# Patient Record
Sex: MALE | Race: WHITE | NOT HISPANIC OR LATINO | Employment: FULL TIME
[De-identification: names, ages, dates, MRNs, and addresses within clinical notes are randomized per-mention and may not be internally consistent; named-entity substitution may affect disease eponyms.]

---

## 2021-12-16 ENCOUNTER — OFFICE VISIT (OUTPATIENT)
Dept: INTERNAL MEDICINE | Facility: OTHER | Age: 46
End: 2021-12-16
Payer: COMMERCIAL

## 2021-12-16 VITALS
SYSTOLIC BLOOD PRESSURE: 134 MMHG | OXYGEN SATURATION: 95 % | TEMPERATURE: 98.2 F | BODY MASS INDEX: 20.44 KG/M2 | WEIGHT: 138 LBS | HEART RATE: 97 BPM | HEIGHT: 69 IN | DIASTOLIC BLOOD PRESSURE: 83 MMHG

## 2021-12-16 DIAGNOSIS — Z13.220 SCREENING FOR LIPID DISORDERS: ICD-10-CM

## 2021-12-16 DIAGNOSIS — F90.9 ATTENTION DEFICIT HYPERACTIVITY DISORDER (ADHD), UNSPECIFIED ADHD TYPE: ICD-10-CM

## 2021-12-16 DIAGNOSIS — R22.1 LUMP ON NECK: ICD-10-CM

## 2021-12-16 DIAGNOSIS — Z13.29 SCREENING FOR THYROID DISORDER: ICD-10-CM

## 2021-12-16 DIAGNOSIS — Z13.1 SCREENING FOR DIABETES MELLITUS: ICD-10-CM

## 2021-12-16 PROCEDURE — 99204 OFFICE O/P NEW MOD 45 MIN: CPT | Mod: GC

## 2021-12-16 RX ORDER — LISDEXAMFETAMINE DIMESYLATE 30 MG/1
CAPSULE ORAL
COMMUNITY
Start: 2021-11-18

## 2021-12-16 ASSESSMENT — PATIENT HEALTH QUESTIONNAIRE - PHQ9: CLINICAL INTERPRETATION OF PHQ2 SCORE: 0

## 2021-12-16 NOTE — PROGRESS NOTES
Date of Service:  12/16/21    CC: Establish care, tooth ache    HPI:  Ar Sotelo is a 46 y.o. male with a PMH of ADHD on Vyvanse presents here to establish [facility]. 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  Patient has been complaining of right-sided bottom tooth ache that he has seen a dentist twice with x-rays and was told that they could not find anything wrong with it.  He also reports of lump that is marble sized to the right jaw that is occasionally sensitive when he experiences the tooth ache, and occasionally enlarges at times.  Patient was previously given Norco but ran out but has been able to deal with the pain with ibuprofen the few times if flared up.  Denies any sore throat, difficulty swallowing, exudates, fever, chills, sick contact, rashes, headaches, neck rigidity, neck pain. Patient admits to not drinking much fluids as he should be.    Review of systems:  Review of Systems   Constitutional: Negative for chills, fever, malaise/fatigue and weight loss.   HENT: Negative for sinus pain and sore throat.         +tooth ache   Eyes: Negative.    Respiratory: Negative for cough, sputum production and shortness of breath.    Cardiovascular: Negative for chest pain and palpitations.   Gastrointestinal: Negative for abdominal pain, constipation, diarrhea, nausea and vomiting.   Genitourinary: Negative.    Musculoskeletal: Negative for myalgias and neck pain.   Neurological: Negative for dizziness, focal weakness and headaches.      Past Medical History:  There are no problems to display for this patient.  vyvanse - ADHD     Past Surgical History:    has no past surgical history on file.      Medications:  Current Outpatient Medications   Medication Sig Dispense Refill   • VYVANSE 30 MG capsule        No current facility-administered medications for this visit.       Allergies:  No Known Allergies    Family History:   family history is not on file.    Father- hypertension     Social History:    Social History     Tobacco Use   • Smoking status: Not on file   • Smokeless tobacco: Not on file   Substance Use Topics   • Alcohol use: Not on file   • Drug use: Not on file     Tobacco: former smoker (in 20s years, 1ppd, quit at age 30)  Alcohol: 8 drinks/week (beers, drinks 4-5 times a week)  Illicit drugs: no marijuana  Employment:     Physical Exam:  Vitals:    12/16/21 0819   BP: 134/83   Pulse: 97   Temp: 36.8 °C (98.2 °F)   SpO2: 95%     Body mass index is 20.68 kg/m².  Physical Exam  Vitals reviewed.   Constitutional:       General: He is not in acute distress.     Appearance: Normal appearance. He is normal weight. He is not toxic-appearing.   HENT:      Head: Normocephalic and atraumatic.      Right Ear: External ear normal.      Left Ear: External ear normal.      Nose: Nose normal.      Mouth/Throat:      Pharynx: No oropharyngeal exudate or posterior oropharyngeal erythema.   Eyes:      General: No scleral icterus.     Extraocular Movements: Extraocular movements intact.      Conjunctiva/sclera: Conjunctivae normal.   Neck:      Comments: No palpable mass noted to submandibular or neck  Cardiovascular:      Rate and Rhythm: Normal rate and regular rhythm.      Heart sounds: Normal heart sounds. No murmur heard.  No friction rub. No gallop.    Pulmonary:      Effort: No respiratory distress.      Breath sounds: Normal breath sounds. No wheezing, rhonchi or rales.   Abdominal:      General: There is no distension.      Palpations: Abdomen is soft.      Tenderness: There is no abdominal tenderness. There is no guarding or rebound.   Musculoskeletal:         General: No swelling.      Cervical back: Normal range of motion.      Right lower leg: No edema.      Left lower leg: No edema.   Lymphadenopathy:      Cervical: No cervical adenopathy.   Skin:     General: Skin is warm and dry.   Neurological:      General: No focal deficit present.      Mental  Status: He is alert and oriented to person, place, and time.   Psychiatric:         Mood and Affect: Mood normal.         Behavior: Behavior normal.       Assessment/Plan:  1. Lump on neck  - US-SOFT TISSUES OF HEAD - NECK; Future  - no significant palpable lump in submandibular or cervical region.  As lump fluctuates in size and occasionally sensitive per patient and no abnormal dental findings by dentistry, will obtain ultrasound of neck.   -No erythema or exudates seen to the oropharynx less likely peritonsillar abscess or strep. Oral exam did not reveal any abnormal dentition. It is possible that this lump could be from sialadenitis, encouraged adequate p.o. fluid intake and can try sour cand to stimulate salivary gland.      2. Screening for lipid disorders  - Lipid Profile; Future  - Comp Metabolic Panel; Future    3. Screening for thyroid disorder  - TSH WITH REFLEX TO FT4; Future    4. Screening for diabetes mellitus  - HEMOGLOBIN A1C; Future    5. Attention deficit hyperactivity disorder (ADHD), unspecified ADHD type  -Patient on Vyvanse, states that his ADHD is well controlled with this medication.  Prescribed through his psych provider    Other orders  - VYVANSE 30 MG capsule    All imaging results and lab results and consult notes are reviewed at this visit.  Followup: Return in about 5 weeks (around 1/20/2022), or if symptoms worsen or fail to improve.    Joan Landon, DO  Internal Medicine PGY-1

## 2021-12-17 PROBLEM — F90.9 ADHD: Status: ACTIVE | Noted: 2021-12-17

## 2021-12-17 ASSESSMENT — ENCOUNTER SYMPTOMS
EYES NEGATIVE: 1
SINUS PAIN: 0
SHORTNESS OF BREATH: 0
DIARRHEA: 0
COUGH: 0
VOMITING: 0
HEADACHES: 0
PALPITATIONS: 0
DIZZINESS: 0
NECK PAIN: 0
NAUSEA: 0
SORE THROAT: 0
MYALGIAS: 0
SPUTUM PRODUCTION: 0
ABDOMINAL PAIN: 0
WEIGHT LOSS: 0
FEVER: 0
CHILLS: 0
CONSTIPATION: 0
FOCAL WEAKNESS: 0

## 2022-01-21 ENCOUNTER — HOSPITAL ENCOUNTER (OUTPATIENT)
Dept: RADIOLOGY | Facility: MEDICAL CENTER | Age: 47
End: 2022-01-21
Payer: COMMERCIAL

## 2022-02-25 ENCOUNTER — APPOINTMENT (OUTPATIENT)
Dept: INTERNAL MEDICINE | Facility: OTHER | Age: 47
End: 2022-02-25
Payer: COMMERCIAL

## 2023-02-26 ENCOUNTER — HOSPITAL ENCOUNTER (EMERGENCY)
Facility: MEDICAL CENTER | Age: 48
End: 2023-02-26
Attending: EMERGENCY MEDICINE
Payer: COMMERCIAL

## 2023-02-26 VITALS
BODY MASS INDEX: 20.73 KG/M2 | HEART RATE: 85 BPM | SYSTOLIC BLOOD PRESSURE: 115 MMHG | HEIGHT: 69 IN | RESPIRATION RATE: 18 BRPM | TEMPERATURE: 97.7 F | DIASTOLIC BLOOD PRESSURE: 79 MMHG | WEIGHT: 140 LBS | OXYGEN SATURATION: 97 %

## 2023-02-26 DIAGNOSIS — S40.011A CONTUSION OF RIGHT SHOULDER, INITIAL ENCOUNTER: ICD-10-CM
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
## 2023-02-26 PROCEDURE — 305948 HCHG GREEN TRAUMA ACT PRE-NOTIFY NO CC

## 2023-02-26 PROCEDURE — 99284 EMERGENCY DEPT VISIT MOD MDM: CPT | Mod: EDC

## 2023-02-26 ASSESSMENT — LIFESTYLE VARIABLES
HAVE YOU EVER FELT YOU SHOULD CUT DOWN ON YOUR DRINKING: NO
TOTAL SCORE: 0
DO YOU DRINK ALCOHOL: NO
TOTAL SCORE: 0
TOTAL SCORE: 0
HAVE PEOPLE ANNOYED YOU BY CRITICIZING YOUR DRINKING: NO
EVER FELT BAD OR GUILTY ABOUT YOUR DRINKING: NO
CONSUMPTION TOTAL: INCOMPLETE
EVER HAD A DRINK FIRST THING IN THE MORNING TO STEADY YOUR NERVES TO GET RID OF A HANGOVER: NO
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
## 2023-10-12 ENCOUNTER — OFFICE VISIT (OUTPATIENT)
Dept: URGENT CARE | Facility: CLINIC | Age: 48
End: 2023-10-12
Payer: COMMERCIAL

## 2023-10-12 VITALS
RESPIRATION RATE: 16 BRPM | DIASTOLIC BLOOD PRESSURE: 64 MMHG | BODY MASS INDEX: 20.59 KG/M2 | SYSTOLIC BLOOD PRESSURE: 110 MMHG | WEIGHT: 139 LBS | OXYGEN SATURATION: 98 % | HEIGHT: 69 IN | TEMPERATURE: 97 F | HEART RATE: 78 BPM

## 2023-10-12 DIAGNOSIS — R10.9 ABDOMINAL CRAMPING: ICD-10-CM

## 2023-10-12 DIAGNOSIS — K52.9 GASTROENTERITIS: ICD-10-CM

## 2023-10-12 LAB
FLUAV RNA SPEC QL NAA+PROBE: NEGATIVE
FLUBV RNA SPEC QL NAA+PROBE: NEGATIVE
RSV RNA SPEC QL NAA+PROBE: NEGATIVE
S PYO DNA SPEC NAA+PROBE: NOT DETECTED
SARS-COV-2 RNA RESP QL NAA+PROBE: NEGATIVE

## 2023-10-12 PROCEDURE — 3078F DIAST BP <80 MM HG: CPT | Performed by: NURSE PRACTITIONER

## 2023-10-12 PROCEDURE — 3074F SYST BP LT 130 MM HG: CPT | Performed by: NURSE PRACTITIONER

## 2023-10-12 PROCEDURE — 0241U POCT CEPHEID COV-2, FLU A/B, RSV - PCR: CPT | Performed by: NURSE PRACTITIONER

## 2023-10-12 PROCEDURE — 99204 OFFICE O/P NEW MOD 45 MIN: CPT | Performed by: NURSE PRACTITIONER

## 2023-10-12 PROCEDURE — 87651 STREP A DNA AMP PROBE: CPT | Performed by: NURSE PRACTITIONER

## 2023-10-12 RX ORDER — DICYCLOMINE HCL 20 MG
20 TABLET ORAL EVERY 8 HOURS PRN
Qty: 20 TABLET | Refills: 0 | Status: SHIPPED | OUTPATIENT
Start: 2023-10-12 | End: 2023-12-01

## 2023-10-12 NOTE — PROGRESS NOTES
Chief Complaint   Patient presents with    Abdominal Pain     X 4 days, fever, sore throat, body aches, chills       HISTORY OF PRESENT ILLNESS: Patient is a pleasant 48 y.o. male who presents to urgent care today with GI upset.  Patient notes that his symptoms for started 4 days ago with malaise, fatigue, body aches, tactile fever.  Initially he noticed a sore throat which has since resolved.  After onset of symptoms he also developed generalized abdominal pain, pain has been somewhat constant with intermittent worsening.  States that his pain was significant last night making it difficult for him to sleep.  Does report several episodes of diarrhea per day.  Denies any blood or black in his stools.  Denies any nausea or vomiting.  He has tried ibuprofen without much improvement.  States that several his of his children are ill as well but with nondescript symptoms (1 has been diagnosed with hand-foot-and-mouth).  He denies previous history of the same.    Patient Active Problem List    Diagnosis Date Noted    ADHD 12/17/2021       Allergies:Patient has no known allergies.    Current Outpatient Medications Ordered in Epic   Medication Sig Dispense Refill    dicyclomine (BENTYL) 20 MG Tab Take 1 Tablet by mouth every 8 hours as needed (Cramping). 20 Tablet 0    VYVANSE 30 MG capsule  (Patient not taking: Reported on 10/12/2023)       No current [facility]-ordered facility-administered medications on file.       History reviewed. No pertinent past medical history.    Social History     Tobacco Use    Smoking status: Never    Smokeless tobacco: Never   Vaping Use    Vaping Use: Never used   Substance Use Topics    Alcohol use: Not Currently    Drug use: Never       No family status information on file.   History reviewed. No pertinent family history.    ROS:  Review of Systems   Constitutional: Positive for fever, chills, malaise, and fatigue.   HENT: Positive for initial sore throat.  Negative for ear pain, nosebleeds,  "congestion, neck pain.    Eyes: Negative for vision changes.   Neuro: Negative for headache, sensory changes, weakness, seizure, LOC.   Cardiovascular: Negative for chest pain, palpitations, orthopnea and leg swelling.   Respiratory: Negative for cough, sputum production, shortness of breath and wheezing.   Gastrointestinal: Positive for abdominal pain, diarrhea.  Negative for nausea, vomiting.  Genitourinary: Negative for dysuria, urgency and frequency.  Musculoskeletal: Negative for falls, neck pain, back pain, joint pain.  Skin: Negative for rash, diaphoresis.     Exam:  /64 (BP Location: Left arm, Patient Position: Sitting, BP Cuff Size: Adult)   Pulse 78   Temp 36.1 °C (97 °F)   Resp 16   Ht 1.753 m (5' 9\")   Wt 63 kg (139 lb)   SpO2 98%   General: well-nourished, well-developed male in NAD  Head: normocephalic, atraumatic  Eyes: PERRLA, no conjunctival injection, acuity grossly intact, lids normal.  Ears: normal shape and symmetry, no tenderness, no discharge. External canals are without any significant edema or erythema. Tympanic membranes are without any inflammation, no effusion. Gross auditory acuity is intact.  Nose: symmetrical without tenderness, no discharge.  Mouth/Throat: reasonable hygiene, no erythema, exudates or tonsillar enlargement.  Neck: no masses, range of motion within normal limits, no tracheal deviation. No obvious thyroid enlargement.   Lymph: no cervical adenopathy. No supraclavicular adenopathy.   Neuro: alert and oriented. Cranial nerves 1-12 grossly intact. No sensory deficit.   Cardiovascular: regular rate and rhythm. No edema.  Pulmonary: no distress. Chest is symmetrical with respiration, no wheezes, crackles, or rhonchi.   Abdomen: soft, non-tender, no guarding, no hepatosplenomegaly.  No CVA tenderness.  Musculoskeletal: no clubbing, appropriate muscle tone, gait is stable.  Skin: warm, dry, intact, no clubbing, no cyanosis, no rashes.   Psych: appropriate mood, " affect, judgement.         Assessment/Plan:  1. Gastroenteritis  POCT GROUP A STREP, PCR    POCT CoV-2, Flu A/B, RSV by PCR      2. Abdominal cramping  dicyclomine (BENTYL) 20 MG Tab    POCT GROUP A STREP, PCR    POCT CoV-2, Flu A/B, RSV by PCR            The patient is a pleasant 48-year-old male who presents with GI symptoms.  He has no correlating abdominal tenderness, suspicion for acute abdomen low.  Suspect viral process.  He did initially have a sore throat as well though, therefore a strep test is initiated.  In addition, COVID/flu/RSV swab also initiated in clinic.  Will notify patient of referral results.  In the meantime, increase fluid intake, bland diet encouraged.  Bentyl as directed.  If no improvement in the next 24 hours, patient instructed to go to emergency department for further evaluation and care, he is in agreement.    Supportive care, differential diagnoses, and indications for immediate follow-up discussed with patient.   Pathogenesis of diagnosis discussed including typical length and natural progression.   Instructed to return to clinic or nearest emergency department for any change in condition, further concerns, or worsening of symptoms.  Patient states understanding of the plan of care and discharge instructions.  Instructed to make an appointment, for follow up, with his primary care provider.        Please note that this dictation was created using voice recognition software. I have made every reasonable attempt to correct obvious errors, but I expect that there are errors of grammar and possibly content that I did not discover before finalizing the note.      LILIBETH Kelly.

## 2023-10-22 ENCOUNTER — APPOINTMENT (OUTPATIENT)
Dept: RADIOLOGY | Facility: MEDICAL CENTER | Age: 48
End: 2023-10-22
Attending: EMERGENCY MEDICINE
Payer: COMMERCIAL

## 2023-10-22 ENCOUNTER — HOSPITAL ENCOUNTER (EMERGENCY)
Facility: MEDICAL CENTER | Age: 48
End: 2023-10-22
Attending: EMERGENCY MEDICINE
Payer: COMMERCIAL

## 2023-10-22 VITALS
WEIGHT: 137.13 LBS | TEMPERATURE: 97.8 F | RESPIRATION RATE: 14 BRPM | BODY MASS INDEX: 20.31 KG/M2 | DIASTOLIC BLOOD PRESSURE: 82 MMHG | SYSTOLIC BLOOD PRESSURE: 121 MMHG | HEART RATE: 86 BPM | HEIGHT: 69 IN | OXYGEN SATURATION: 97 %

## 2023-10-22 DIAGNOSIS — R93.5 ABNORMAL ABDOMINAL ULTRASOUND: ICD-10-CM

## 2023-10-22 DIAGNOSIS — R10.13 EPIGASTRIC PAIN: ICD-10-CM

## 2023-10-22 LAB
ALBUMIN SERPL BCP-MCNC: 4.5 G/DL (ref 3.2–4.9)
ALBUMIN/GLOB SERPL: 1.5 G/DL
ALP SERPL-CCNC: 55 U/L (ref 30–99)
ALT SERPL-CCNC: 17 U/L (ref 2–50)
ANION GAP SERPL CALC-SCNC: 12 MMOL/L (ref 7–16)
APPEARANCE UR: CLEAR
AST SERPL-CCNC: 16 U/L (ref 12–45)
BASOPHILS # BLD AUTO: 1 % (ref 0–1.8)
BASOPHILS # BLD: 0.07 K/UL (ref 0–0.12)
BILIRUB SERPL-MCNC: 1 MG/DL (ref 0.1–1.5)
BILIRUB UR QL STRIP.AUTO: NEGATIVE
BUN SERPL-MCNC: 13 MG/DL (ref 8–22)
CALCIUM ALBUM COR SERPL-MCNC: 9 MG/DL (ref 8.5–10.5)
CALCIUM SERPL-MCNC: 9.4 MG/DL (ref 8.5–10.5)
CHLORIDE SERPL-SCNC: 101 MMOL/L (ref 96–112)
CO2 SERPL-SCNC: 26 MMOL/L (ref 20–33)
COLOR UR: YELLOW
CREAT SERPL-MCNC: 0.97 MG/DL (ref 0.5–1.4)
EOSINOPHIL # BLD AUTO: 0.25 K/UL (ref 0–0.51)
EOSINOPHIL NFR BLD: 3.5 % (ref 0–6.9)
ERYTHROCYTE [DISTWIDTH] IN BLOOD BY AUTOMATED COUNT: 37.8 FL (ref 35.9–50)
GFR SERPLBLD CREATININE-BSD FMLA CKD-EPI: 97 ML/MIN/1.73 M 2
GLOBULIN SER CALC-MCNC: 3 G/DL (ref 1.9–3.5)
GLUCOSE SERPL-MCNC: 98 MG/DL (ref 65–99)
GLUCOSE UR STRIP.AUTO-MCNC: NEGATIVE MG/DL
HCT VFR BLD AUTO: 46.9 % (ref 42–52)
HGB BLD-MCNC: 16.2 G/DL (ref 14–18)
IMM GRANULOCYTES # BLD AUTO: 0.01 K/UL (ref 0–0.11)
IMM GRANULOCYTES NFR BLD AUTO: 0.1 % (ref 0–0.9)
KETONES UR STRIP.AUTO-MCNC: ABNORMAL MG/DL
LACTATE SERPL-SCNC: 1.4 MMOL/L (ref 0.5–2)
LEUKOCYTE ESTERASE UR QL STRIP.AUTO: NEGATIVE
LIPASE SERPL-CCNC: 30 U/L (ref 11–82)
LYMPHOCYTES # BLD AUTO: 2.01 K/UL (ref 1–4.8)
LYMPHOCYTES NFR BLD: 28 % (ref 22–41)
MCH RBC QN AUTO: 30.7 PG (ref 27–33)
MCHC RBC AUTO-ENTMCNC: 34.5 G/DL (ref 32.3–36.5)
MCV RBC AUTO: 88.8 FL (ref 81.4–97.8)
MICRO URNS: ABNORMAL
MONOCYTES # BLD AUTO: 0.56 K/UL (ref 0–0.85)
MONOCYTES NFR BLD AUTO: 7.8 % (ref 0–13.4)
NEUTROPHILS # BLD AUTO: 4.27 K/UL (ref 1.82–7.42)
NEUTROPHILS NFR BLD: 59.6 % (ref 44–72)
NITRITE UR QL STRIP.AUTO: NEGATIVE
NRBC # BLD AUTO: 0 K/UL
NRBC BLD-RTO: 0 /100 WBC (ref 0–0.2)
PH UR STRIP.AUTO: 6.5 [PH] (ref 5–8)
PLATELET # BLD AUTO: 351 K/UL (ref 164–446)
PMV BLD AUTO: 10.1 FL (ref 9–12.9)
POTASSIUM SERPL-SCNC: 3.9 MMOL/L (ref 3.6–5.5)
PROT SERPL-MCNC: 7.5 G/DL (ref 6–8.2)
PROT UR QL STRIP: NEGATIVE MG/DL
RBC # BLD AUTO: 5.28 M/UL (ref 4.7–6.1)
RBC UR QL AUTO: NEGATIVE
SODIUM SERPL-SCNC: 139 MMOL/L (ref 135–145)
SP GR UR STRIP.AUTO: 1.02
UROBILINOGEN UR STRIP.AUTO-MCNC: 1 MG/DL
WBC # BLD AUTO: 7.2 K/UL (ref 4.8–10.8)

## 2023-10-22 PROCEDURE — 36415 COLL VENOUS BLD VENIPUNCTURE: CPT

## 2023-10-22 PROCEDURE — 700102 HCHG RX REV CODE 250 W/ 637 OVERRIDE(OP): Performed by: EMERGENCY MEDICINE

## 2023-10-22 PROCEDURE — A9270 NON-COVERED ITEM OR SERVICE: HCPCS | Performed by: EMERGENCY MEDICINE

## 2023-10-22 PROCEDURE — 85025 COMPLETE CBC W/AUTO DIFF WBC: CPT

## 2023-10-22 PROCEDURE — 700105 HCHG RX REV CODE 258: Performed by: EMERGENCY MEDICINE

## 2023-10-22 PROCEDURE — 99284 EMERGENCY DEPT VISIT MOD MDM: CPT

## 2023-10-22 PROCEDURE — 83605 ASSAY OF LACTIC ACID: CPT

## 2023-10-22 PROCEDURE — 81003 URINALYSIS AUTO W/O SCOPE: CPT

## 2023-10-22 PROCEDURE — 80053 COMPREHEN METABOLIC PANEL: CPT

## 2023-10-22 PROCEDURE — 76705 ECHO EXAM OF ABDOMEN: CPT

## 2023-10-22 PROCEDURE — 83690 ASSAY OF LIPASE: CPT

## 2023-10-22 RX ORDER — SODIUM CHLORIDE 9 MG/ML
1000 INJECTION, SOLUTION INTRAVENOUS ONCE
Status: COMPLETED | OUTPATIENT
Start: 2023-10-22 | End: 2023-10-22

## 2023-10-22 RX ORDER — OMEPRAZOLE 20 MG/1
20 CAPSULE, DELAYED RELEASE ORAL DAILY
Qty: 14 CAPSULE | Refills: 0 | Status: SHIPPED | OUTPATIENT
Start: 2023-10-22 | End: 2023-11-05

## 2023-10-22 RX ADMIN — SODIUM CHLORIDE 1000 ML: 9 INJECTION, SOLUTION INTRAVENOUS at 13:00

## 2023-10-22 RX ADMIN — LIDOCAINE HYDROCHLORIDE 15 ML: 20 SOLUTION OROPHARYNGEAL at 13:01

## 2023-10-22 NOTE — ED TRIAGE NOTES
Ar Sotelo  48 y.o. male  Chief Complaint   Patient presents with    Abdominal Pain     Pt states he has been having unresolved abdominal pain for 2x weeks. Pt states it began with some flu like symptoms and diarrhea, with severe abdominal pain. Now pt has persistent abdominal pain, made worse by eating food.        Vitals:    10/22/23 1148   BP: 126/86   Pulse: (!) 102   Resp: 14   Temp: 36.5 °C (97.7 °F)   SpO2: 96%       Patient educated on triage process and encouraged to alert staff of any changes in condition.    Pt ambulatory to triage with the above complaint

## 2023-10-22 NOTE — ED PROVIDER NOTES
"ED Provider Note    CHIEF COMPLAINT  Chief Complaint   Patient presents with    Abdominal Pain     Pt states he has been having unresolved abdominal pain for 2x weeks. Pt states it began with some flu like symptoms and diarrhea, with severe abdominal pain. Now pt has persistent abdominal pain, made worse by eating food.        EXTERNAL RECORDS REVIEWED  Outpatient Notes Office visit 10/12/23    HPI/ROS  LIMITATION TO HISTORY   Select: : None  OUTSIDE HISTORIAN(S):  None    Ar Sotelo is a 48 y.o. male who presents to the emergency department for evaluation of abdominal pain.  The patient states that about 2 weeks ago he felt like he was ill with fevers and chills, general malaise and myalgias.  He states that he started having upper abdominal pain at that time.  He states that the pain has persisted and is dull and achy in character.  It does not radiate.  He states that eating food makes the pain worse.  He denies alleviating factors.  He has not had any previous abdominal surgeries, suspicious food intake, sick contacts or recent travel.  He denies any fevers recently.  He denies dysuria, hematuria, or back pain.  He denies rhinorrhea, cough, sore throat, chest pain or difficulty breathing.     PAST MEDICAL HISTORY  None    SURGICAL HISTORY  patient denies any surgical history    FAMILY HISTORY  History reviewed. No pertinent family history.    SOCIAL HISTORY  Social History     Tobacco Use    Smoking status: Never    Smokeless tobacco: Never   Vaping Use    Vaping Use: Never used   Substance and Sexual Activity    Alcohol use: Yes     Comment: 4-5 daily    Drug use: Never    Sexual activity: Not on file       CURRENT MEDICATIONS  Home Medications    **Home medications have not yet been reviewed for this encounter**         ALLERGIES  No Known Allergies    PHYSICAL EXAM  VITAL SIGNS: /85   Pulse 88   Temp 36.5 °C (97.7 °F) (Temporal)   Resp 14   Ht 1.753 m (5' 9\")   Wt 62.2 kg (137 lb 2 oz)   SpO2 " 97%   BMI 20.25 kg/m²   Constitutional: Alert and in no apparent distress.  HENT: Normocephalic atraumatic. Bilateral external ears normal. Nose normal. Mucous membranes are moist.  Eyes: Pupils are equal and reactive. Conjunctiva normal. Non-icteric sclera.   Cardiovascular: Tachycardic rate and regular rhythm. No murmurs, gallops or rubs.  Thorax & Lungs: No increased work of breathing. Breath sounds are clear to auscultation bilaterally. No wheezing, rhonchi or rales.  Abdomen: Soft, nontender and nondistended. No hepatosplenomegaly.  Skin: Warm and dry. No rashes are noted.  Back: No bony tenderness, No CVA tenderness.   Extremities: 2+ peripheral pulses. Cap refill is less than 2 seconds. No edema, cyanosis, or clubbing.  Musculoskeletal: Good range of motion in all major joints. No tenderness to palpation or major deformities noted.   Neurologic: Alert and appropriate for age. The patient moves all 4 extremities without obvious deficits.    DIAGNOSTIC STUDIES / PROCEDURES    LABS  Results for orders placed or performed during the hospital encounter of 10/22/23   CBC WITH DIFFERENTIAL   Result Value Ref Range    WBC 7.2 4.8 - 10.8 K/uL    RBC 5.28 4.70 - 6.10 M/uL    Hemoglobin 16.2 14.0 - 18.0 g/dL    Hematocrit 46.9 42.0 - 52.0 %    MCV 88.8 81.4 - 97.8 fL    MCH 30.7 27.0 - 33.0 pg    MCHC 34.5 32.3 - 36.5 g/dL    RDW 37.8 35.9 - 50.0 fL    Platelet Count 351 164 - 446 K/uL    MPV 10.1 9.0 - 12.9 fL    Neutrophils-Polys 59.60 44.00 - 72.00 %    Lymphocytes 28.00 22.00 - 41.00 %    Monocytes 7.80 0.00 - 13.40 %    Eosinophils 3.50 0.00 - 6.90 %    Basophils 1.00 0.00 - 1.80 %    Immature Granulocytes 0.10 0.00 - 0.90 %    Nucleated RBC 0.00 0.00 - 0.20 /100 WBC    Neutrophils (Absolute) 4.27 1.82 - 7.42 K/uL    Lymphs (Absolute) 2.01 1.00 - 4.80 K/uL    Monos (Absolute) 0.56 0.00 - 0.85 K/uL    Eos (Absolute) 0.25 0.00 - 0.51 K/uL    Baso (Absolute) 0.07 0.00 - 0.12 K/uL    Immature Granulocytes (abs) 0.01  0.00 - 0.11 K/uL    NRBC (Absolute) 0.00 K/uL   COMP METABOLIC PANEL   Result Value Ref Range    Sodium 139 135 - 145 mmol/L    Potassium 3.9 3.6 - 5.5 mmol/L    Chloride 101 96 - 112 mmol/L    Co2 26 20 - 33 mmol/L    Anion Gap 12.0 7.0 - 16.0    Glucose 98 65 - 99 mg/dL    Bun 13 8 - 22 mg/dL    Creatinine 0.97 0.50 - 1.40 mg/dL    Calcium 9.4 8.5 - 10.5 mg/dL    Correct Calcium 9.0 8.5 - 10.5 mg/dL    AST(SGOT) 16 12 - 45 U/L    ALT(SGPT) 17 2 - 50 U/L    Alkaline Phosphatase 55 30 - 99 U/L    Total Bilirubin 1.0 0.1 - 1.5 mg/dL    Albumin 4.5 3.2 - 4.9 g/dL    Total Protein 7.5 6.0 - 8.2 g/dL    Globulin 3.0 1.9 - 3.5 g/dL    A-G Ratio 1.5 g/dL   LIPASE   Result Value Ref Range    Lipase 30 11 - 82 U/L   URINALYSIS    Specimen: Urine   Result Value Ref Range    Color Yellow     Character Clear     Specific Gravity 1.017 <1.035    Ph 6.5 5.0 - 8.0    Glucose Negative Negative mg/dL    Ketones Trace (A) Negative mg/dL    Protein Negative Negative mg/dL    Bilirubin Negative Negative    Urobilinogen, Urine 1.0 Negative    Nitrite Negative Negative    Leukocyte Esterase Negative Negative    Occult Blood Negative Negative    Micro Urine Req see below    ESTIMATED GFR   Result Value Ref Range    GFR (CKD-EPI) 97 >60 mL/min/1.73 m 2   LACTIC ACID   Result Value Ref Range    Lactic Acid 1.4 0.5 - 2.0 mmol/L     RADIOLOGY  I have independently interpreted the diagnostic imaging associated with this visit and am waiting the final reading from the radiologist.   My preliminary interpretation is as follows: No obvious stones noted  Radiologist interpretation:   US-RUQ   Final Result      1.  4 mm polyp versus nonshadowing gallstone in the gallbladder.      2.  No other gallstones identified.      3.  Small amount of sludge is noted in the gallbladder.      4.  No gallbladder wall thickening.      5.  Exam is otherwise within normal limits.        COURSE & MEDICAL DECISION MAKING    ED Observation Status? Yes; I am placing  the patient in to an observation status due to a diagnostic uncertainty as well as therapeutic intensity. Patient placed in observation status at 12:51 PM, 10/22/2023.     Observation plan is as follows: Labs, imaging and reassessment    Upon Reevaluation, the patient's condition has: Improved; and will be discharged.    Patient discharged from ED Observation status at 2:57 PM (Time) 10/22/23 (Date).     INITIAL ASSESSMENT, COURSE AND PLAN  Care Narrative: This is a 48-year-old male presenting to the emergency department for evaluation of abdominal pain.  On initial evaluation, the patient did not appear to be in any acute distress.  His vital signs were notable for a mild tachycardia but were otherwise stable.  Physical exam was reassuring with no abdominal tenderness or distension.  I am less concerned for obstruction, perforation, or serious bacterial illness.    Work-up was initiated in the ED and his white count was normal and reassuring.  Lactic acid was also normal at 1.4.  I am less concern for sepsis or serious bacterial illness at this point.  His electrolytes were completely normal with no derangement.  LFTs and lipase were also normal and I am less concerned for acute pancreatitis or hepatobiliary disease.  Urinalysis was completely clean with no evidence of infection or blood concerning for occult UTI, pyelonephritis, or kidney stone.    Ultrasound of the right upper quadrant was performed and revealed a possible gallbladder polyp versus stone.  No wall thickening or pericholecystic fluid concerning for acute cholecystitis was noted.    The patient was observed in the ED and his pain was treated with a GI cocktail.  He was able to tolerate an oral challenge.  I do think he is stable for discharge at this time.  A referral was placed to GI as he may require a scope and further work-up of the possible gallbladder polyp.  He was given a prescription for omeprazole and encouraged to follow-up.  He will  return to the emergency department with any worsening signs or symptoms.    The patient presents with abdominal pain without signs of peritonitis or other life-threatening or serious etiology. The patient appears stable for discharge and has been instructed to return immediately if the symptoms worsen in any way, or in 8-12hr if not improved for re-evaluation. The patient has been instructed to return if the symptoms worsen or change in any way.    HYDRATION: Based on the patient's presentation of Tachycardia the patient was given IV fluids. IV Hydration was used because oral hydration was not adequate alone. Upon recheck following hydration, the patient was improved.      ADDITIONAL PROBLEM LIST  Abdominal pain, abnormal abdominal ultrasound  DISPOSITION AND DISCUSSIONS  I have discussed management of the patient with the following physicians and ROSA's: None    Discussion of management with other QHP or appropriate source(s): None     Escalation of care considered, and ultimately not performed:acute inpatient care management, however at this time, the patient is most appropriate for outpatient management    Barriers to care at this time, including but not limited to:  None .     Decision tools and prescription drugs considered including, but not limited to:  None .    FINAL IMPRESSION  1. Epigastric pain    2. Abnormal abdominal ultrasound      PRESCRIPTIONS  New Prescriptions    OMEPRAZOLE (PRILOSEC) 20 MG DELAYED-RELEASE CAPSULE    Take 1 Capsule by mouth every day for 14 days.     FOLLOW UP  Andi Hylton M.D.  [address] [place] [address] [address] [place] [id number] [phone]    Call in 1 day  To schedule a follow up appointment    [facility], Emergency Dept  [address] [facility] [id number] [phone]  Go to   As needed    -DISCHARGE-    Electronically signed by: Radha Jolly D.O., 10/22/2023 12:39 PM

## 2023-12-01 ENCOUNTER — OFFICE VISIT (OUTPATIENT)
Dept: MEDICAL GROUP | Facility: MEDICAL CENTER | Age: 48
End: 2023-12-01
Payer: COMMERCIAL

## 2023-12-01 VITALS
TEMPERATURE: 97.9 F | DIASTOLIC BLOOD PRESSURE: 76 MMHG | SYSTOLIC BLOOD PRESSURE: 114 MMHG | BODY MASS INDEX: 20.14 KG/M2 | WEIGHT: 136 LBS | OXYGEN SATURATION: 98 % | HEIGHT: 69 IN | HEART RATE: 76 BPM

## 2023-12-01 DIAGNOSIS — R93.2 ABNORMAL GALLBLADDER ULTRASOUND: ICD-10-CM

## 2023-12-01 DIAGNOSIS — Z78.9 ALCOHOL USE: ICD-10-CM

## 2023-12-01 DIAGNOSIS — Z12.11 SCREENING FOR COLORECTAL CANCER: ICD-10-CM

## 2023-12-01 DIAGNOSIS — Z30.09 VASECTOMY EVALUATION: ICD-10-CM

## 2023-12-01 DIAGNOSIS — Z12.12 SCREENING FOR COLORECTAL CANCER: ICD-10-CM

## 2023-12-01 DIAGNOSIS — Z23 NEED FOR VACCINATION: ICD-10-CM

## 2023-12-01 DIAGNOSIS — Z11.3 ROUTINE SCREENING FOR STI (SEXUALLY TRANSMITTED INFECTION): ICD-10-CM

## 2023-12-01 DIAGNOSIS — F90.9 ATTENTION DEFICIT HYPERACTIVITY DISORDER (ADHD), UNSPECIFIED ADHD TYPE: ICD-10-CM

## 2023-12-01 DIAGNOSIS — Z11.59 ENCOUNTER FOR HEPATITIS C SCREENING TEST FOR LOW RISK PATIENT: ICD-10-CM

## 2023-12-01 PROCEDURE — 3078F DIAST BP <80 MM HG: CPT | Performed by: STUDENT IN AN ORGANIZED HEALTH CARE EDUCATION/TRAINING PROGRAM

## 2023-12-01 PROCEDURE — 90686 IIV4 VACC NO PRSV 0.5 ML IM: CPT | Performed by: STUDENT IN AN ORGANIZED HEALTH CARE EDUCATION/TRAINING PROGRAM

## 2023-12-01 PROCEDURE — 99204 OFFICE O/P NEW MOD 45 MIN: CPT | Mod: 25 | Performed by: STUDENT IN AN ORGANIZED HEALTH CARE EDUCATION/TRAINING PROGRAM

## 2023-12-01 PROCEDURE — 90715 TDAP VACCINE 7 YRS/> IM: CPT | Performed by: STUDENT IN AN ORGANIZED HEALTH CARE EDUCATION/TRAINING PROGRAM

## 2023-12-01 PROCEDURE — 90471 IMMUNIZATION ADMIN: CPT | Performed by: STUDENT IN AN ORGANIZED HEALTH CARE EDUCATION/TRAINING PROGRAM

## 2023-12-01 PROCEDURE — 90472 IMMUNIZATION ADMIN EACH ADD: CPT | Performed by: STUDENT IN AN ORGANIZED HEALTH CARE EDUCATION/TRAINING PROGRAM

## 2023-12-01 PROCEDURE — 3074F SYST BP LT 130 MM HG: CPT | Performed by: STUDENT IN AN ORGANIZED HEALTH CARE EDUCATION/TRAINING PROGRAM

## 2023-12-01 ASSESSMENT — ENCOUNTER SYMPTOMS
SHORTNESS OF BREATH: 0
HEADACHES: 0
PALPITATIONS: 0
VOMITING: 0
CHILLS: 0
FEVER: 0
BLOOD IN STOOL: 0
NAUSEA: 0
WHEEZING: 0
DIZZINESS: 0
WEIGHT LOSS: 0
DIARRHEA: 0
ABDOMINAL PAIN: 0
CONSTIPATION: 0

## 2023-12-01 ASSESSMENT — FIBROSIS 4 INDEX: FIB4 SCORE: 0.52

## 2023-12-01 ASSESSMENT — PATIENT HEALTH QUESTIONNAIRE - PHQ9: CLINICAL INTERPRETATION OF PHQ2 SCORE: 0

## 2023-12-01 NOTE — PROGRESS NOTES
"Subjective:     CC:  Diagnoses of Alcohol use, Need for vaccination, Screening for colorectal cancer, Attention deficit hyperactivity disorder (ADHD), unspecified ADHD type, Routine screening for STI (sexually transmitted infection), Encounter for hepatitis C screening test for low risk patient, Vasectomy evaluation, and Abnormal gallbladder ultrasound were pertinent to this visit.    HISTORY OF THE PRESENT ILLNESS: Patient is a 48 y.o. male. This pleasant patient is here today to establish care and discuss       History of ADHD on Vyvanse follows with Philip Lee    Problem   Alcohol Use    History of alcohol use. Sober since ~10/2023  History of abdominal pain/ urgent care visit/ ed visit - unclear if related       Abnormal Gallbladder Ultrasound    Us for evaluation of abdominal pain found possible gallbladder polyp vs stone/ sludge.          Health Maintenance:     ROS:   Review of Systems   Constitutional:  Negative for chills, fever and weight loss.   HENT:  Negative for hearing loss.    Respiratory:  Negative for shortness of breath and wheezing.    Cardiovascular:  Negative for chest pain and palpitations.   Gastrointestinal:  Negative for abdominal pain, blood in stool, constipation, diarrhea, melena, nausea and vomiting.   Genitourinary:  Negative for frequency and urgency.   Skin:  Negative for rash.   Neurological:  Negative for dizziness and headaches.         Objective:       Exam: /76 (BP Location: Left arm, Patient Position: Sitting)   Pulse 76   Temp 36.6 °C (97.9 °F) (Temporal)   Ht 1.753 m (5' 9\")   Wt 61.7 kg (136 lb)   SpO2 98%  Body mass index is 20.08 kg/m².    Physical Exam  Constitutional:       Appearance: Normal appearance.   Cardiovascular:      Rate and Rhythm: Normal rate and regular rhythm.   Pulmonary:      Effort: Pulmonary effort is normal.      Breath sounds: Normal breath sounds.   Musculoskeletal:      Cervical back: Normal range of motion and neck supple. "   Lymphadenopathy:      Cervical: No cervical adenopathy.   Neurological:      Mental Status: He is alert.           Labs:     Assessment & Plan:   48 y.o. male with the following -    1. Alcohol use  Chronic, stable  Sober since around 10/2023  Fib 4 - r/o advance fibrosis   - Lipid Profile; Future  - TSH WITH REFLEX TO FT4; Future    2. Need for vaccination    - INFLUENZA VACCINE QUAD INJ (PF)  - Tdap =>8yo IM    3. Screening for colorectal cancer    - Referral to GI for Colonoscopy    4. Attention deficit hyperactivity disorder (ADHD), unspecified ADHD type  Chronic, stable  On vyvanse by psych  BP < 130/80  - Lipid Profile; Future  - TSH WITH REFLEX TO FT4; Future    5. Routine screening for STI (sexually transmitted infection)    - HIV AG/AB COMBO ASSAY SCREENING; Future    6. Encounter for hepatitis C screening test for low risk patient    - HEP C VIRUS ANTIBODY; Future    Return in about 1 year (around 12/1/2024) for Lab review.    Please note that this dictation was created using voice recognition software. I have made every reasonable attempt to correct obvious errors, but I expect that there are errors of grammar and possibly content that I did not discover before finalizing the note.

## 2024-01-17 ENCOUNTER — OFFICE VISIT (OUTPATIENT)
Dept: UROLOGY | Facility: MEDICAL CENTER | Age: 49
End: 2024-01-17
Payer: COMMERCIAL

## 2024-01-17 VITALS — BODY MASS INDEX: 20.57 KG/M2 | HEIGHT: 69 IN | WEIGHT: 138.9 LBS

## 2024-01-17 DIAGNOSIS — Z30.09 VASECTOMY EVALUATION: ICD-10-CM

## 2024-01-17 PROCEDURE — 99202 OFFICE O/P NEW SF 15 MIN: CPT | Performed by: STUDENT IN AN ORGANIZED HEALTH CARE EDUCATION/TRAINING PROGRAM

## 2024-01-17 RX ORDER — DIAZEPAM 10 MG/1
10 TABLET ORAL
Qty: 1 TABLET | Refills: 0 | Status: SHIPPED | OUTPATIENT
Start: 2024-01-17 | End: 2024-01-17

## 2024-01-17 ASSESSMENT — FIBROSIS 4 INDEX: FIB4 SCORE: 0.52

## 2024-01-17 NOTE — PROGRESS NOTES
Subjective  Ar Sotelo is a 48 y.o. male who presents today for evaluation of vasectomy.    He does have children. He has discussed this at length with his partner and he would like to proceed with vasectomy as a permanent form of contraception. He does not have  history of prior  history or surgeries. He is not on anticoagulation.  He does not have a history of a bleeding disorder. He does not have scrotal/testicular pain.      Family History   Problem Relation Age of Onset    Cancer Father         cancer prostate    Ovarian Cancer Neg Hx     Tubal Cancer Neg Hx     Breast Cancer Neg Hx     Colorectal Cancer Neg Hx     Peritoneal Cancer Neg Hx        Social History     Socioeconomic History    Marital status:      Spouse name: Not on file    Number of children: Not on file    Years of education: Not on file    Highest education level: Not on file   Occupational History    Not on file   Tobacco Use    Smoking status: Former     Current packs/day: 0.00     Average packs/day: 1 pack/day for 10.0 years (10.0 ttl pk-yrs)     Types: Cigarettes     Start date:      Quit date: 2006     Years since quittin.0    Smokeless tobacco: Never   Vaping Use    Vaping Use: Never used   Substance and Sexual Activity    Alcohol use: Not Currently     Comment: stopped drinking 4-5 beer daily   on 10/2023 after episode of epigastric pain.    Drug use: Never    Sexual activity: Not on file   Other Topics Concern    Not on file   Social History Narrative    Not on file     Social Determinants of Health     Financial Resource Strain: Not on file   Food Insecurity: Not on file   Transportation Needs: Not on file   Physical Activity: Not on file   Stress: Not on file   Social Connections: Not on file   Intimate Partner Violence: Not on file   Housing Stability: Not on file       No past surgical history on file.    No past medical history on file.    Current Outpatient Medications   Medication Sig Dispense Refill     "VYVANSE 30 MG capsule        No current facility-administered medications for this visit.       No Known Allergies    Objective  Ht 1.753 m (5' 9\")   Wt 63 kg (138 lb 14.4 oz)   Physical Exam  Constitutional:       Appearance: Normal appearance.   HENT:      Head: Normocephalic and atraumatic.   Eyes:      Extraocular Movements: Extraocular movements intact.      Conjunctiva/sclera: Conjunctivae normal.   Pulmonary:      Effort: No respiratory distress.   Genitourinary:     Comments: Vas deferens easily palpable bilaterally, normal testes  Musculoskeletal:      Cervical back: Normal range of motion.   Skin:     General: Skin is warm and dry.   Neurological:      General: No focal deficit present.      Mental Status: He is alert.   Psychiatric:         Mood and Affect: Mood normal.         Labs:   CBC   Lab Results   Component Value Date/Time    WBC 7.2 10/22/2023 1208    RBC 5.28 10/22/2023 1208    HEMOGLOBIN 16.2 10/22/2023 1208    HEMATOCRIT 46.9 10/22/2023 1208    MCV 88.8 10/22/2023 1208    MCH 30.7 10/22/2023 1208    MCHC 34.5 10/22/2023 1208    RDW 37.8 10/22/2023 1208    MPV 10.1 10/22/2023 1208    LYMPHOCYTES 28.00 10/22/2023 1208    LYMPHS 2.01 10/22/2023 1208    MONOCYTES 7.80 10/22/2023 1208    MONOS 0.56 10/22/2023 1208    EOSINOPHILS 3.50 10/22/2023 1208    EOS 0.25 10/22/2023 1208    BASOPHILS 1.00 10/22/2023 1208    BASO 0.07 10/22/2023 1208    NRBC 0.00 10/22/2023 1208     BMP   Lab Results   Component Value Date/Time    SODIUM 139 10/22/2023 1208    POTASSIUM 3.9 10/22/2023 1208    CHLORIDE 101 10/22/2023 1208    CO2 26 10/22/2023 1208    GLUCOSE 98 10/22/2023 1208    BUN 13 10/22/2023 1208    CREATININE 0.97 10/22/2023 1208    CALCIUM 9.4 10/22/2023 1208         Imaging:     none    Assessment    Vasectomy was discussed in detail including that this is a form a permanent sterilization/contraception, which has potential complications of bleeding, infection, pain, post-vasectomy pain syndrome, and " "recanalization.  A vasectomy packet was given to the patient and he was given oral and written instructions regarding the need to have a negative post-vasectomy semen analysis prior to being declared \"infertile\", and the need for continued alternative forms of birth control until that time.      Plan    Problem List Items Addressed This Visit       Vasectomy evaluation     10mg diazepam sent today  RTC for vasectomy            "

## 2024-02-12 ENCOUNTER — OFFICE VISIT (OUTPATIENT)
Dept: UROLOGY | Facility: MEDICAL CENTER | Age: 49
End: 2024-02-12
Payer: COMMERCIAL

## 2024-02-12 DIAGNOSIS — Z30.09 VASECTOMY EVALUATION: ICD-10-CM

## 2024-02-12 PROCEDURE — 55250 REMOVAL OF SPERM DUCT(S): CPT | Performed by: STUDENT IN AN ORGANIZED HEALTH CARE EDUCATION/TRAINING PROGRAM

## 2024-02-12 NOTE — PROGRESS NOTES
Subjective  Ar Sotelo is a 48 y.o. male who presents today for vasectomy. He understands this is considered a permanent form of sterilization/contraception and wishes to proceed.    Family History   Problem Relation Age of Onset    Cancer Father         cancer prostate    Ovarian Cancer Neg Hx     Tubal Cancer Neg Hx     Breast Cancer Neg Hx     Colorectal Cancer Neg Hx     Peritoneal Cancer Neg Hx        Social History     Socioeconomic History    Marital status:      Spouse name: Not on file    Number of children: Not on file    Years of education: Not on file    Highest education level: Not on file   Occupational History    Not on file   Tobacco Use    Smoking status: Former     Current packs/day: 0.00     Average packs/day: 1 pack/day for 10.0 years (10.0 ttl pk-yrs)     Types: Cigarettes     Start date:      Quit date:      Years since quittin.1    Smokeless tobacco: Never   Vaping Use    Vaping Use: Never used   Substance and Sexual Activity    Alcohol use: Not Currently     Comment: stopped drinking 4-5 beer daily   on 10/2023 after episode of epigastric pain.    Drug use: Never    Sexual activity: Not on file   Other Topics Concern    Not on file   Social History Narrative    Not on file     Social Determinants of Health     Financial Resource Strain: Not on file   Food Insecurity: Not on file   Transportation Needs: Not on file   Physical Activity: Not on file   Stress: Not on file   Social Connections: Not on file   Intimate Partner Violence: Not on file   Housing Stability: Not on file       No past surgical history on file.    No past medical history on file.    Current Outpatient Medications   Medication Sig Dispense Refill    VYVANSE 30 MG capsule        No current facility-administered medications for this visit.       No Known Allergies    Objective  There were no vitals taken for this visit.  Physical Exam  Constitutional:       Appearance: Normal appearance.   HENT:       Head: Normocephalic and atraumatic.   Eyes:      Extraocular Movements: Extraocular movements intact.      Conjunctiva/sclera: Conjunctivae normal.   Pulmonary:      Effort: No respiratory distress.   Genitourinary:     Penis: Normal.       Testes: Normal.   Musculoskeletal:      Cervical back: Normal range of motion.   Skin:     General: Skin is warm and dry.   Neurological:      General: No focal deficit present.      Mental Status: He is alert.   Psychiatric:         Mood and Affect: Mood normal.         Labs:     none    Procedure    SURGEON: Dr. Gale Chandra    PREOPERATIVE DIAGNOSIS: Vasectomy status.     POSTOPERATIVE DIAGNOSIS: Vasectomy status.     PROCEDURE PERFORMED: Vasectomy.     ANESTHESIA: 1% Lidocaine plain, 10 mg Diazepam    BLOOD LOSS: 1cc    SPECIMENS REMOVED: None.     TUBES, LINES, AND DRAINS: None.     PROCEDURE FINDINGS:     1. Successful vasectomy.     DESCRIPTION OF PROCEDURE: Informed consent was obtained. The patient was taken back to the procedure room and placed on the table in a supine position. The patient was then prepped and draped in the usual sterile fashion. I identified the right vas deferens. A cord block was performed  with 1% Lidocaine. I then isolated the right vas deferens and injected 5 mL of 1% lidocaine plain subcutaneously and deep towards the vas deferens itself. I then used the scalpel to make a 4 mm incision over the vas deferens. I then used the sharp vasectomy dissector to bluntly dissect on either side of the vas until it was mobilized enough to grasp with the ring clamp. I then brought this up through the scrotal incision and used the vasectomy dissectors to clear off the fascial tissue. Once the vas was appropriately skeletonized, I then clamped the midpoint of the vas deferens with the vasectomy dissector. The proximal end of the vas was tied, cut, and cauterized. The proximal end was released. I then tied the intervening dartos tissue to stagger the proximal  and distal ends. The distal end was then tied, cut and cauterized. A 0.5 cm segment of the vas deferens was removed. No active bleeding was seen. I then turned my attention to the left side and repeated the procedure as stated above. I then evaluated both sides for any signs of bleeding and none was seen. I then used a 3-0 chromic suture to close the skin incisions with a horizontal mattress suture. This concluded the procedure, the patient tolerated it well.      Assessment: Vasectomy Post-Procedure     Advil (Ibuprofen) and Tylenol (Acetaminophen) as needed for pain control every 6 hours. It is recommended that you alternate between these medications every 3 hours for the first 24 hours.     Apply ice as needed, 10 minutes on and 10 minutes off, with a barrier between your skin and the ice. Continue this for the first 48 hours     You may shower after 48 hours     Supportive underwear (briefs or boxer briefs, no boxers)     No lifting > 15 pound for 10 days     No sexual activity for 10 days     No strenuous activity or straddle activities (bicycles, motorcycles, riding , etc) for 10 days     You will provide a semen sample in 3 months for semen analysis, you will be called with the result. You will need to bring this to the lab within one hour of collection.      Plan    Vasectomy  -Will call with results of semen analysis  -RTC as needed

## 2024-02-13 DIAGNOSIS — Z98.52 STATUS POST VASECTOMY: ICD-10-CM
